# Patient Record
Sex: FEMALE | Race: BLACK OR AFRICAN AMERICAN | NOT HISPANIC OR LATINO | Employment: FULL TIME | URBAN - METROPOLITAN AREA
[De-identification: names, ages, dates, MRNs, and addresses within clinical notes are randomized per-mention and may not be internally consistent; named-entity substitution may affect disease eponyms.]

---

## 2021-04-05 ENCOUNTER — HOSPITAL ENCOUNTER (EMERGENCY)
Facility: HOSPITAL | Age: 56
Discharge: HOME | End: 2021-04-05
Attending: EMERGENCY MEDICINE
Payer: COMMERCIAL

## 2021-04-05 VITALS
TEMPERATURE: 98 F | BODY MASS INDEX: 25.27 KG/M2 | HEART RATE: 62 BPM | DIASTOLIC BLOOD PRESSURE: 58 MMHG | SYSTOLIC BLOOD PRESSURE: 113 MMHG | WEIGHT: 148 LBS | HEIGHT: 64 IN | OXYGEN SATURATION: 99 % | RESPIRATION RATE: 16 BRPM

## 2021-04-05 DIAGNOSIS — M79.601 PAIN OF RIGHT UPPER EXTREMITY: Primary | ICD-10-CM

## 2021-04-05 DIAGNOSIS — E87.6 HYPOKALEMIA: ICD-10-CM

## 2021-04-05 PROBLEM — E78.5 HYPERLIPIDEMIA: Status: ACTIVE | Noted: 2018-02-07

## 2021-04-05 PROBLEM — R26.89 IMPAIRED BALANCE AS LATE EFFECT OF CEREBROVASCULAR ACCIDENT: Status: ACTIVE | Noted: 2018-08-07

## 2021-04-05 PROBLEM — I10 HYPERTENSION: Status: ACTIVE | Noted: 2018-02-07

## 2021-04-05 PROBLEM — I69.398 IMPAIRED BALANCE AS LATE EFFECT OF CEREBROVASCULAR ACCIDENT: Status: ACTIVE | Noted: 2018-08-07

## 2021-04-05 PROBLEM — I69.351 HEMIPARESIS AFFECTING RIGHT SIDE AS LATE EFFECT OF STROKE: Status: ACTIVE | Noted: 2018-02-19

## 2021-04-05 PROBLEM — M25.511 RIGHT SHOULDER PAIN: Status: ACTIVE | Noted: 2021-04-05

## 2021-04-05 PROBLEM — R73.03 PREDIABETES: Status: ACTIVE | Noted: 2019-12-30

## 2021-04-05 PROBLEM — F33.42 RECURRENT MAJOR DEPRESSIVE DISORDER, IN FULL REMISSION: Status: ACTIVE | Noted: 2018-02-19

## 2021-04-05 PROBLEM — I63.81 BASAL GANGLIA INFARCTION: Status: ACTIVE | Noted: 2018-02-07

## 2021-04-05 PROBLEM — M50.90 CERVICAL NECK PAIN WITH EVIDENCE OF DISC DISEASE: Status: ACTIVE | Noted: 2018-06-21

## 2021-04-05 LAB
ALBUMIN SERPL BCP-MCNC: 3.7 G/DL (ref 3.5–5.2)
ALP SERPL-CCNC: 103 U/L (ref 55–135)
ALT SERPL W/O P-5'-P-CCNC: 21 U/L (ref 10–44)
ANION GAP SERPL CALC-SCNC: 9 MMOL/L (ref 8–16)
AST SERPL-CCNC: 23 U/L (ref 10–40)
BASOPHILS # BLD AUTO: 0.03 K/UL (ref 0–0.2)
BASOPHILS NFR BLD: 0.4 % (ref 0–1.9)
BILIRUB SERPL-MCNC: 0.4 MG/DL (ref 0.1–1)
BUN SERPL-MCNC: 15 MG/DL (ref 6–20)
BUN SERPL-MCNC: 16 MG/DL (ref 6–30)
CALCIUM SERPL-MCNC: 9.3 MG/DL (ref 8.7–10.5)
CHLORIDE SERPL-SCNC: 103 MMOL/L (ref 95–110)
CHLORIDE SERPL-SCNC: 104 MMOL/L (ref 95–110)
CHOLEST SERPL-MCNC: 101 MG/DL (ref 120–199)
CHOLEST/HDLC SERPL: 2.4 {RATIO} (ref 2–5)
CO2 SERPL-SCNC: 26 MMOL/L (ref 23–29)
CREAT SERPL-MCNC: 1.2 MG/DL (ref 0.5–1.4)
CREAT SERPL-MCNC: 1.3 MG/DL (ref 0.5–1.4)
CREAT SERPL-MCNC: 1.4 MG/DL (ref 0.5–1.4)
CTP QC/QA: YES
DIFFERENTIAL METHOD: ABNORMAL
EOSINOPHIL # BLD AUTO: 0.2 K/UL (ref 0–0.5)
EOSINOPHIL NFR BLD: 3.1 % (ref 0–8)
ERYTHROCYTE [DISTWIDTH] IN BLOOD BY AUTOMATED COUNT: 13.4 % (ref 11.5–14.5)
EST. GFR  (AFRICAN AMERICAN): 54.1 ML/MIN/1.73 M^2
EST. GFR  (NON AFRICAN AMERICAN): 47 ML/MIN/1.73 M^2
GLUCOSE SERPL-MCNC: 96 MG/DL (ref 70–110)
GLUCOSE SERPL-MCNC: 97 MG/DL (ref 70–110)
GLUCOSE SERPL-MCNC: NORMAL MG/DL (ref 70–110)
HCT VFR BLD AUTO: 32.7 % (ref 37–48.5)
HCT VFR BLD CALC: 33 %PCV (ref 36–54)
HCV AB SERPL QL IA: NEGATIVE
HDLC SERPL-MCNC: 42 MG/DL (ref 40–75)
HDLC SERPL: 41.6 % (ref 20–50)
HGB BLD-MCNC: 10 G/DL (ref 12–16)
HIV 1+2 AB+HIV1 P24 AG SERPL QL IA: NEGATIVE
IMM GRANULOCYTES # BLD AUTO: 0.01 K/UL (ref 0–0.04)
IMM GRANULOCYTES NFR BLD AUTO: 0.1 % (ref 0–0.5)
INR PPP: 1 (ref 0.8–1.2)
LDLC SERPL CALC-MCNC: 44 MG/DL (ref 63–159)
LYMPHOCYTES # BLD AUTO: 2.4 K/UL (ref 1–4.8)
LYMPHOCYTES NFR BLD: 35.8 % (ref 18–48)
MAGNESIUM SERPL-MCNC: 2 MG/DL (ref 1.6–2.6)
MCH RBC QN AUTO: 27.6 PG (ref 27–31)
MCHC RBC AUTO-ENTMCNC: 30.6 G/DL (ref 32–36)
MCV RBC AUTO: 90 FL (ref 82–98)
MONOCYTES # BLD AUTO: 0.4 K/UL (ref 0.3–1)
MONOCYTES NFR BLD: 6.1 % (ref 4–15)
NEUTROPHILS # BLD AUTO: 3.7 K/UL (ref 1.8–7.7)
NEUTROPHILS NFR BLD: 54.5 % (ref 38–73)
NONHDLC SERPL-MCNC: 59 MG/DL
NRBC BLD-RTO: 0 /100 WBC
PLATELET # BLD AUTO: 154 K/UL (ref 150–450)
PMV BLD AUTO: 10.7 FL (ref 9.2–12.9)
POC IONIZED CALCIUM: 1.15 MMOL/L (ref 1.06–1.42)
POC PTINR: 1.1 (ref 0.9–1.2)
POC PTWBT: 12.8 SEC (ref 9.7–14.3)
POC TCO2 (MEASURED): 27 MMOL/L (ref 23–29)
POTASSIUM BLD-SCNC: 2.7 MMOL/L (ref 3.5–5.1)
POTASSIUM SERPL-SCNC: 2.7 MMOL/L (ref 3.5–5.1)
PROT SERPL-MCNC: 7 G/DL (ref 6–8.4)
PROTHROMBIN TIME: 10.6 SEC (ref 9–12.5)
RBC # BLD AUTO: 3.62 M/UL (ref 4–5.4)
SAMPLE: ABNORMAL
SAMPLE: NORMAL
SAMPLE: NORMAL
SARS-COV-2 RDRP RESP QL NAA+PROBE: NEGATIVE
SODIUM BLD-SCNC: 140 MMOL/L (ref 136–145)
SODIUM SERPL-SCNC: 139 MMOL/L (ref 136–145)
TRIGL SERPL-MCNC: 75 MG/DL (ref 30–150)
TSH SERPL DL<=0.005 MIU/L-ACNC: 2.84 UIU/ML (ref 0.4–4)
WBC # BLD AUTO: 6.76 K/UL (ref 3.9–12.7)

## 2021-04-05 PROCEDURE — 99285 EMERGENCY DEPT VISIT HI MDM: CPT | Mod: ,,, | Performed by: PSYCHIATRY & NEUROLOGY

## 2021-04-05 PROCEDURE — 85610 PROTHROMBIN TIME: CPT | Performed by: EMERGENCY MEDICINE

## 2021-04-05 PROCEDURE — 82565 ASSAY OF CREATININE: CPT

## 2021-04-05 PROCEDURE — 93010 ELECTROCARDIOGRAM REPORT: CPT | Mod: ,,, | Performed by: INTERNAL MEDICINE

## 2021-04-05 PROCEDURE — 25000003 PHARM REV CODE 250: Performed by: PHYSICIAN ASSISTANT

## 2021-04-05 PROCEDURE — 80061 LIPID PANEL: CPT | Performed by: EMERGENCY MEDICINE

## 2021-04-05 PROCEDURE — 99285 PR EMERGENCY DEPT VISIT,LEVEL V: ICD-10-PCS | Mod: ,,, | Performed by: PSYCHIATRY & NEUROLOGY

## 2021-04-05 PROCEDURE — 99285 EMERGENCY DEPT VISIT HI MDM: CPT | Mod: 25

## 2021-04-05 PROCEDURE — 93005 ELECTROCARDIOGRAM TRACING: CPT

## 2021-04-05 PROCEDURE — U0002 COVID-19 LAB TEST NON-CDC: HCPCS | Performed by: EMERGENCY MEDICINE

## 2021-04-05 PROCEDURE — 80053 COMPREHEN METABOLIC PANEL: CPT | Performed by: EMERGENCY MEDICINE

## 2021-04-05 PROCEDURE — 85610 PROTHROMBIN TIME: CPT

## 2021-04-05 PROCEDURE — 99285 PR EMERGENCY DEPT VISIT,LEVEL V: ICD-10-PCS | Mod: CS,,, | Performed by: PHYSICIAN ASSISTANT

## 2021-04-05 PROCEDURE — 99285 EMERGENCY DEPT VISIT HI MDM: CPT | Mod: CS,,, | Performed by: PHYSICIAN ASSISTANT

## 2021-04-05 PROCEDURE — 86703 HIV-1/HIV-2 1 RESULT ANTBDY: CPT | Performed by: EMERGENCY MEDICINE

## 2021-04-05 PROCEDURE — 86803 HEPATITIS C AB TEST: CPT | Performed by: EMERGENCY MEDICINE

## 2021-04-05 PROCEDURE — 82962 GLUCOSE BLOOD TEST: CPT

## 2021-04-05 PROCEDURE — 25500020 PHARM REV CODE 255: Performed by: EMERGENCY MEDICINE

## 2021-04-05 PROCEDURE — 93010 EKG 12-LEAD: ICD-10-PCS | Mod: ,,, | Performed by: INTERNAL MEDICINE

## 2021-04-05 PROCEDURE — 85025 COMPLETE CBC W/AUTO DIFF WBC: CPT | Performed by: EMERGENCY MEDICINE

## 2021-04-05 PROCEDURE — 99900035 HC TECH TIME PER 15 MIN (STAT)

## 2021-04-05 PROCEDURE — 84443 ASSAY THYROID STIM HORMONE: CPT | Performed by: EMERGENCY MEDICINE

## 2021-04-05 PROCEDURE — 83735 ASSAY OF MAGNESIUM: CPT | Performed by: PHYSICIAN ASSISTANT

## 2021-04-05 RX ORDER — ASPIRIN 325 MG
325 TABLET ORAL
COMMUNITY

## 2021-04-05 RX ORDER — NITROGLYCERIN 0.4 MG/1
0.4 TABLET SUBLINGUAL DAILY PRN
COMMUNITY

## 2021-04-05 RX ORDER — ARIPIPRAZOLE 5 MG/1
5 TABLET ORAL
COMMUNITY

## 2021-04-05 RX ORDER — TRAZODONE HYDROCHLORIDE 100 MG/1
100 TABLET ORAL
COMMUNITY
End: 2024-01-31

## 2021-04-05 RX ORDER — ACETAMINOPHEN 500 MG
1000 TABLET ORAL
Status: COMPLETED | OUTPATIENT
Start: 2021-04-05 | End: 2021-04-05

## 2021-04-05 RX ORDER — IBUPROFEN 100 MG/5ML
1000 SUSPENSION, ORAL (FINAL DOSE FORM) ORAL
COMMUNITY

## 2021-04-05 RX ORDER — FUROSEMIDE 40 MG/1
TABLET ORAL
COMMUNITY

## 2021-04-05 RX ORDER — ROSUVASTATIN CALCIUM 40 MG/1
TABLET, COATED ORAL
COMMUNITY
Start: 2020-07-14

## 2021-04-05 RX ORDER — FENTANYL 50 UG/1
1 PATCH TRANSDERMAL
COMMUNITY

## 2021-04-05 RX ORDER — ACETAMINOPHEN 500 MG
TABLET ORAL
COMMUNITY

## 2021-04-05 RX ORDER — TOPIRAMATE 100 MG/1
100 TABLET, FILM COATED ORAL
COMMUNITY

## 2021-04-05 RX ORDER — METOPROLOL SUCCINATE 25 MG/1
50 TABLET, EXTENDED RELEASE ORAL
COMMUNITY
End: 2024-01-31

## 2021-04-05 RX ORDER — POTASSIUM CHLORIDE 7.45 MG/ML
10 INJECTION INTRAVENOUS
Status: DISCONTINUED | OUTPATIENT
Start: 2021-04-05 | End: 2021-04-05 | Stop reason: HOSPADM

## 2021-04-05 RX ADMIN — ACETAMINOPHEN 1000 MG: 500 TABLET ORAL at 01:04

## 2021-04-05 RX ADMIN — IOHEXOL 100 ML: 350 INJECTION, SOLUTION INTRAVENOUS at 11:04

## 2022-04-07 ENCOUNTER — HISTORICAL (OUTPATIENT)
Dept: ADMINISTRATIVE | Facility: HOSPITAL | Age: 57
End: 2022-04-07
Payer: COMMERCIAL

## 2022-04-24 VITALS
HEIGHT: 64 IN | BODY MASS INDEX: 25.78 KG/M2 | DIASTOLIC BLOOD PRESSURE: 80 MMHG | WEIGHT: 151 LBS | SYSTOLIC BLOOD PRESSURE: 129 MMHG

## 2023-01-30 ENCOUNTER — APPOINTMENT (OUTPATIENT)
Dept: LAB | Facility: HOSPITAL | Age: 58
End: 2023-01-30
Attending: OBSTETRICS & GYNECOLOGY
Payer: COMMERCIAL

## 2023-01-30 DIAGNOSIS — R68.82 DECREASED LIBIDO: Primary | ICD-10-CM

## 2023-01-30 LAB — TESTOST SERPL-MCNC: <12.98 NG/DL (ref 12.4–35.75)

## 2023-01-30 PROCEDURE — 36415 COLL VENOUS BLD VENIPUNCTURE: CPT

## 2023-01-30 PROCEDURE — 84403 ASSAY OF TOTAL TESTOSTERONE: CPT

## 2023-12-04 DIAGNOSIS — M54.16 LUMBAR RADICULOPATHY: Primary | ICD-10-CM

## 2024-01-31 ENCOUNTER — OFFICE VISIT (OUTPATIENT)
Dept: NEUROLOGY | Facility: CLINIC | Age: 59
End: 2024-01-31
Payer: COMMERCIAL

## 2024-01-31 VITALS
BODY MASS INDEX: 23.39 KG/M2 | WEIGHT: 137 LBS | HEIGHT: 64 IN | SYSTOLIC BLOOD PRESSURE: 124 MMHG | DIASTOLIC BLOOD PRESSURE: 82 MMHG

## 2024-01-31 DIAGNOSIS — G62.9 POLYNEUROPATHY: Primary | ICD-10-CM

## 2024-01-31 DIAGNOSIS — R26.0 SENSORY ATAXIC GAIT: ICD-10-CM

## 2024-01-31 DIAGNOSIS — M54.16 LUMBAR RADICULOPATHY: ICD-10-CM

## 2024-01-31 PROCEDURE — 1159F MED LIST DOCD IN RCRD: CPT | Mod: CPTII,S$GLB,, | Performed by: SPECIALIST

## 2024-01-31 PROCEDURE — 99214 OFFICE O/P EST MOD 30 MIN: CPT | Mod: S$GLB,,, | Performed by: SPECIALIST

## 2024-01-31 PROCEDURE — 99999 PR PBB SHADOW E&M-EST. PATIENT-LVL V: CPT | Mod: PBBFAC,,, | Performed by: SPECIALIST

## 2024-01-31 PROCEDURE — 3008F BODY MASS INDEX DOCD: CPT | Mod: CPTII,S$GLB,, | Performed by: SPECIALIST

## 2024-01-31 PROCEDURE — 3079F DIAST BP 80-89 MM HG: CPT | Mod: CPTII,S$GLB,, | Performed by: SPECIALIST

## 2024-01-31 PROCEDURE — 3074F SYST BP LT 130 MM HG: CPT | Mod: CPTII,S$GLB,, | Performed by: SPECIALIST

## 2024-01-31 RX ORDER — DAPAGLIFLOZIN 10 MG/1
10 TABLET, FILM COATED ORAL DAILY
COMMUNITY
Start: 2024-01-26

## 2024-01-31 RX ORDER — METOPROLOL SUCCINATE 50 MG/1
50 TABLET, EXTENDED RELEASE ORAL
COMMUNITY
Start: 2023-11-10

## 2024-01-31 RX ORDER — ZINC GLUCONATE 50 MG
50 TABLET ORAL DAILY
COMMUNITY

## 2024-01-31 RX ORDER — TRAZODONE HYDROCHLORIDE 150 MG/1
300 TABLET ORAL NIGHTLY
COMMUNITY

## 2024-01-31 RX ORDER — GABAPENTIN 600 MG/1
600 TABLET ORAL 2 TIMES DAILY
COMMUNITY
Start: 2024-01-09 | End: 2024-07-07

## 2024-01-31 RX ORDER — OXYCODONE AND ACETAMINOPHEN 7.5; 325 MG/1; MG/1
1 TABLET ORAL 2 TIMES DAILY PRN
COMMUNITY
Start: 2023-12-30

## 2024-01-31 RX ORDER — LEVOMILNACIPRAN HYDROCHLORIDE 80 MG/1
1 CAPSULE, EXTENDED RELEASE ORAL
COMMUNITY
Start: 2024-01-26

## 2024-01-31 NOTE — PROGRESS NOTES
Subjective:      @Patient ID: Smitha Martinez is a 58 y.o. female.    Chief Complaint: NP ref by Dr Venegas for neuro cons to eval for L Radiculo (  HPI:            Pt has numbness, tingling burning and achy pain in B feet, toe and B legs for over a year. Diff w walking. Pt had MRI at [facility] in 01/2024. Had a EMG done at Dr Sanaz boo 3 mons ago and was told she has Peripheral Neuropathy.     Lumbar surgery 2015 then stroke then pacemaker     Notes may also be on facesheet for HPI, ROS, and other sections   Review of Systems       Social History     Tobacco Use    Smoking status: Never    Smokeless tobacco: Never   Substance Use Topics    Alcohol use: Never    Drug use: Never     [x]       [x] Working     [x] Drives       ----------------------------  Hypertension  Leaky heart valve  Pacemaker  Stroke  Current Outpatient Medications   Medication Instructions    ARIPiprazole (ABILIFY) 5 mg, Oral    ascorbic acid (vitamin C) (VITAMIN C) 1,000 mg, Oral    aspirin 325 mg, Oral    cholecalciferol, vitamin D3, (VITAMIN D3) 50 mcg (2,000 unit) Cap Oral    dapagliflozin propanediol (FARXIGA) 10 mg, Oral, Daily    fentaNYL (DURAGESIC) 50 mcg/hr 1 patch, Transdermal    FETZIMA 80 mg Cs24 1 capsule, Oral    furosemide (LASIX) 40 MG tablet Oral    gabapentin (NEURONTIN) 600 mg, Oral, 2 times daily    metoprolol succinate (TOPROL-XL) 50 mg, Oral    naloxegoL (MOVANTIK) 25 mg, Oral, Daily PRN    nitroGLYCERIN (NITROSTAT) 0.4 mg, Sublingual, Daily PRN    oxyCODONE-acetaminophen (PERCOCET) 7.5-325 mg per tablet 1 tablet, Oral, 2 times daily PRN    rosuvastatin (CRESTOR) 40 MG Tab TAKE ONE TABLET BY MOUTH EVERY DAY AT NIGHT    topiramate (TOPAMAX) 100 mg, Oral    traZODone (DESYREL) 300 mg, Oral, Nightly    zinc gluconate 50 mg, Oral, Daily      Medications Discontinued During This Encounter   Medication Reason    traZODone (DESYREL) 100 MG tablet     metoprolol succinate (TOPROL-XL) 25 MG 24 hr tablet    Topiramate for nerve  "pain     Objective:      Exam:   Visit Vitals  /82   Ht 5' 4" (1.626 m)   Wt 62.1 kg (137 lb)   BMI 23.52 kg/m²     General Exam  Pts  is here         body habitus_ Body mass index is 23.52 kg/m².  [x]Gen exam overall unremarkable    []Abnormalities, if present, checked   [x]Mental Status_alert and appropriate    []Oropharynx_Mallampati grade_1 or 2  [] OP   M3    [] M4  []Neck_ no bruits     [] Bruit   [x]Heart__mostly regular   [x]Extremities_ no edema or lesions   [] Extr edema     Neurological:  []Normal neuro exam        [x]Cortical function seems normal  Abnormalities, if present, checked     [x]Speech __ normal    []    Cranial nerves:    []  [x]CN 2 VF_ok     []  []Fundi_ normal     []  []CN 3, 4, 6 EOMs_ok   [x] Minimal dysconjugate gaze perhaps   []CN 3, pupils_ok   []  [x]CN 7_no lower face asymmetry  []  [x]CN 8_hearing _ ok   []  [x]CN 12 tongue_ok   []    []Motor__ normal all groups   [x] Arose from chair w arms folded but not easily and has L ankle df weakness   []Tone: normal     [x]Floppy L ankle tone   []Reflexes__ normal or unremarkable  [x] Absent   []Vib Sens_ normal in extr's incl toes  [x]Absent at toes   []Pin Sens_    [x]Absent in feet   [x]Plantars__ flat     []  [x]Tremor: _ none    []  []Coordination: _ F to N normal  []  []Gait_       [x]__unassisted but wide based and slow _____  []Romberg: negative    [x]Romberg positive     []MMSE; if done:         No data to display                 Neuroimaging:  [x] Images and imaging reports reviewed.  Rads summary:  My comments: in my view her L spine does not explain her     Labs:    [x]  New Patient         []  Multiple Issues/ diagnoses or problems  [if not enumerated in note then discussed but not documented]    Complexity of Data:   [x] High    [] Moderate   [x] Images and reports reviewed  [] Other studies reviewed   [] History obtained from accompaniment [] Differential Diagnoses discussed   [x] Studies considered/ discussed " but not ordered [] Studies ordered     Risks:   [x] High     [] Moderate   [] (poss or definite) neurodegenerative condition [x] () autoimmune condition with possibility of flares or unexpected attack  [] () seiz d.o. with possib of recurr seiz's  [] Cerebrovasc ds with risk of recurrent stroke  [] CNS meds (and/or) potentially high risk non CNS meds taken or discussed which may cause med or behav SE's  [x] Fall risk [x] Driving discussed  [] Diagnosis unclear or DDx wide making risk uncertain   []:    MDM:    [x] High     [] Moderate       Assessment/Plan:         ICD-10-CM ICD-9-CM   1. Polyneuropathy  G62.9 356.9   2. Sensory ataxic gait  R26.0 781.2   3. Lumbar radiculopathy  M54.16 724.4   Given her age and that her diabetes is not severe and that she's had a small vessel stroke nearly a decade ago I am concerned she may have a systemic autoimmune process with the neuropathy possibly being autoimm or even vasculitic          Other Comments / Follow Up:            Orders Placed This Encounter   Procedures    Ambulatory referral/consult to Neurology    Hopefully neuromuscular colleague Yohan can see her   Consider LP cns repeat imaging     consider nerve and mm biopsy           Duane Bansal MD CLAY FAAN, [facility]  Neuroscience Center Medical Director   [facility]

## 2024-04-05 ENCOUNTER — TELEPHONE (OUTPATIENT)
Dept: NEUROLOGY | Facility: CLINIC | Age: 59
End: 2024-04-05
Payer: COMMERCIAL

## 2024-04-23 ENCOUNTER — TELEPHONE (OUTPATIENT)
Dept: NEUROLOGY | Facility: CLINIC | Age: 59
End: 2024-04-23

## 2024-05-14 ENCOUNTER — TELEPHONE (OUTPATIENT)
Dept: NEUROLOGY | Facility: CLINIC | Age: 59
End: 2024-05-14
Payer: COMMERCIAL

## 2024-05-14 NOTE — TELEPHONE ENCOUNTER
----- Message from Annemarie Alcantar sent at 5/14/2024  1:36 PM CDT -----  Regarding: apt  Contact: [phone]  Pt calling  in to reschedule apt pt was schedule on 5/3/24 please call to discuss Further

## 2024-06-26 ENCOUNTER — TELEPHONE (OUTPATIENT)
Dept: NEUROLOGY | Facility: CLINIC | Age: 59
End: 2024-06-26
Payer: COMMERCIAL

## 2024-06-26 NOTE — TELEPHONE ENCOUNTER
----- Message from Annemarie Alcantar sent at 6/26/2024 10:06 AM CDT -----  Regarding: apt  Contact: [phone]  Pt calling in regarding apt on 7/5/24 pt stated Odalis told her pat was there last time she called to confirm apt I told pt I didn't se apt please call to discuss further

## 2024-07-22 NOTE — PROGRESS NOTES
[facility]Y [address] [address] [facility] [place]    Date: 7/26/24  Patient Name: Smitha Martinez   MRN: [id number]   Referring Provider: No ref. provider found    Thank you so much No ref. provider found for your patient referral to Neuromuscular team at [facility]. We take pride in our care coordination and look forward to your feedback and questions.    Assessment:   Ms. Martinez is a 58 year-old female who presents for neuropathy. Neurologic examination demonstrates a length-dependent predominantly sensory (small and large fiber) asymmetric (L > R) polyneuropathy. Large fiber dysfunction is prominent, with positive Romberg and marked gait difficulty. Suspect that underlying etiology is metabolic: at this time, diabetes and renal dysfunction are the most apparent contributors. However, merits broader work-up with additional consideration of autoimmune/infectious causes. EMG should be repeated.    Plan:   See below.    Problem List Items Addressed This Visit          Neuro    Polyneuropathy - Primary    Current Assessment & Plan     -neuropathy laboratories  ->B1, B6, B12/MMA  ->ESR/CRP  ->AFSHAN, SSA/B, RF/CCP  ->ganglioside panel  ->hepatitis, HIV, Treponema pallidum serologies  ->heavy metal screen  -EMG 3 extremities following above evaluation         Relevant Orders    VITAMIN B1    VITAMIN B6    VITAMIN B12 (Completed)    METHYLMALONIC ACID, SERUM    Sedimentation rate (Completed)    C-REACTIVE PROTEIN (Completed)    AFSHAN    RHEUMATOID FACTOR (Completed)    ANTI -SSA ANTIBODY    ANTI-SSB ANTIBODY    Treponema Pallidium Antibodies IgG, IgM (Completed)    HEAVY METALS SCREEN, BLOOD (QUANTITATIVE)    HEPATITIS PANEL, ACUTE (Completed)    IMMUNOGLOBULIN FREE LT CHAINS BLOOD    Ganglioside Antibody Panel, Serum    EMG W/ ULTRASOUND AND NERVE CONDUCTION TEST 3 Extremities    HIV 1/2 Ag/Ab (4th Gen)     RUTHY Hector D.O.  Neurology PGY III  [facility]     This evaluation was completed  "in >60  Minutes over 50% of the time spent on education & counseling. This includes face to face time and non-face to face time preparing to see the patient (eg, review of tests), obtaining and/or reviewing separately obtained history, documenting clinical information in the electronic or other health record, independently interpreting results and communicating results to the patient/family/caregiver, or care coordinator.    Visit today is associated with current or anticipated ongoing medical care related to this patient's single serious condition/complex condition (polyneuropathy). Follow up: 3 months.    Details provided by:    Patient  Family-spouse    Reason for visit: paresthesias and weakness    HISTORY OF PRESENT ILLNESS   HPI: 7/26/24  58F. Hx. diabetes mellitus type II, stroke (2015), bradycardia s/p pacemaker placement, remote left ovarian s/p resection (retained uterus and right ovary). Presents for evaluation of neuropathy. Has previously seen Dr. Bansal (neurology), with there being concern for an underlying vasculitic/autoimmune cause. Patient states that symptoms began ~1.5 years ago, with paresthesias (numbness/tingling, "zapping"/electric-like sensation, occasional "coldness") over the left hallux. Quickly evolved to involved all of the digits of the LLE, then progressed to the knee and then the thigh--in a diffuse/circumferential manner--within 4 months. She began to experience similar symptoms in the RLE ~6 months ago, which then progressed up to the thigh within 4-5 months. Hasn't noticed change in paresthesias with temperature. Denies rash, though notes previous oral ulcers. Along with paresthesias, has experienced worsening weakness in the bilateral lower extremities: difficulty going up stairs, "dragging feet" (L>R), more recently RUE weakness--all of this has impaired her ability to perform ADLs,  helps her stand, walk. She has needed to start using a cane. Has sustained several " falls. Denies tobacco or alcohol use. 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 Lives with .    Review of Systems:  12 system review of systems is negative except for the symptoms mentioned in HPI.     Chart Review:  Neuroimaging reviewed/interpreted  MRI brain w/o contrast: 5/8/15  Findings: There is artifact from dental device. The area of acute restricted diffusion seen on the previous study in the left parietal lobe has resolved. On viewed areas, there is no evidence of new acute CVA however, the frontal lobes are obscured. On axial T2 and FLAIR sequences, there is minimal small vessel ischemic change involving the left parietal lobe likely representing evolution in region of prior infarct. All findings appear chronic. There is no evidence of hemorrhage. No new areas of acute restricted diffusion are identified. The suprasellar cistern basal cisterns and cerebellopontine angle regions are unremarkable. The pituitary gland pituitary infundibulum optic chiasm dianne brainstem and cerebellum are normal as well. Intracranial flow voids are unremarkable. There is no evidence of new acute disease.     Impression: Interval evolution of left-sided focal acute infarct since prior study. All findings are now chronic with no evidence of new acute disease.     MRI cervical spine w/o contrast: 7/3/18  Findings:  There is straightening of the cervical lordosis. Cervical vertebral bodies are normal in height. Disc space narrowing C5-C6 and C6-C7. Disc desiccation throughout the cervical spine. Anterior osteophytes C5, C6 and C7. Cervical vertebral bodies well aligned. No fracture identified. No prevertebral soft tissue space swelling. The cervical spinal cord demonstrates normal signal intensity.     MRI lumbar spine w/o contrast: 1/10/23  T12-L1: Only captured on sagittal. Severe left foraminal narrowing related to symmetric disc bulge and facet hypertrophy. No significant right foraminal narrowing.   L1-L2: Moderate right and  "mild left foraminal narrowing related to symmetric disc bulge and facet hypertrophy. No significant canal narrowing.   L2-L3: Mild canal, moderate right and mild left foraminal narrowing related to symmetric disc bulge and facet hypertrophy.   L3-L4: Postsurgical changes. Mild right foraminal narrowing related to facet hypertrophy. No significant canal or left foraminal narrowing.   L4-L5: Postsurgical changes. Mild right foraminal narrowing related to facet hypertrophy. No significant canal or left foraminal narrowing.   L5-S1: Mild right foraminal narrowing related to symmetric disc bulge and facet hypertrophy. No significant canal or left foraminal narrowing.     CTA head/neck: 4/5/21  No evidence of acute hemorrhage or major vascular distribution infarct. CT arteriogram demonstrates no evidence of advanced atherosclerotic change. No high-grade stenosis or large vessel occlusion. Aberrant right subclavian artery. Postsurgical and degenerative change in the cervical spine.    Neurophysiology reviewed  EMG   -done previously per Dr. Yo: not available in EMR, but reportedly demonstrated "peripheral neuropathy"    Laboratories reviewed  CMP  -Cr 1.22, eGFR 52  Lipid Panel (4/1/24)  -cholesterol 156  -TAGs: 40  A1C  -11/20/23: 6.0%  -12/9/21: 6.9%  TSH (4/1/24)  -0.781  B9: 11/22/23  -3.9  B12: 11/22/23  -454  SPEP/VERONA: 11/22/23  -unremarkable  Hepatitis C: 4/5/21  -negative    Consultant's notes reviewed  -[facility]: 1/13/24    PHYSICAL EXAMINATION     Vitals:    07/26/24 0752   BP: 124/74   Pulse: 77   Weight: 71.1 kg (156 lb 10.2 oz)       Body mass index is 26.89 kg/m².     GENERAL/CONSTITUTIONAL/SYSTEMIC:    -Well appearing; well nourished    Head: Atraumatic, normocephalic  HEENT: PERRLA, EOMI, Oral mucosa moist  Neck: Supple, trachea midline  Cardiovascular: Regular rate and rhythm  Pulmonary: CTAB, no increased work of breathing, no rhonchi or wheezing  Abdominal: Soft, non-tender, " non-distended  Extremities: Warm, well-perfused, no significant edema  Psychiatric: Normal mood & affect; behavior normal & appropriate  Skin: No jaundice, rashes    HIGHER INTEGRATIVE FUNCTIONS:  -Attention & concentration: Normal  -Orientation: Oriented to person, place & time  -Memory: Normal  -Language: Normal  -Fund of Knowledge: Normal    CRANIAL NERVES:  -CN 2: Visual fields full  -CN 2,3: PERRL  -CN 3,4,6: EOMI  -CN 5: Facial sensation intact bilaterally  -CN 7: Mild right facial droop (chronic)  -CN 8: Hearing normal bilaterally  -CN 9,10: Palate elevates symmetrically  -CN 11: Normal shoulder shrug and head turn  -CN 12: Tongue protrudes midline    MOTOR:  -Tone: normal in upper and lower extremities  -UE/LE motor: 5/5 throughout, aside from hip flexion (4+/5 bilaterally)    SENSATION:  -Diminished vibration to knees bilaterally, diminished pin-prick in a length-dependent/circumferential pattern to the left knee and right mid-shin (~2cm above the ankle); patchy diminished sensation throughout the upper extremities, more consistently present over the dorsum of the hands/forearms  -Romberg overtly positive    REFLEXES:  -2/4 upper extremities bilaterally, 1/4 lower extremities bilaterally  -Flexor plantar reflex bilaterally    COORDINATION:  -FNF normal bilaterally    GAIT:  -marked difficulty with tandem gait    Scheduled Follow-up:  Future Appointments   Date Time Provider Department Center   8/5/2024  2:00 PM Duane Bansal MD [address]       After Visit Medication List :     Medication List            Accurate as of July 26, 2024 11:03 PM. If you have any questions, ask your nurse or doctor.                CONTINUE taking these medications      ARIPiprazole 5 MG Tab  Commonly known as: ABILIFY     ascorbic acid (vitamin C) 1000 MG tablet  Commonly known as: VITAMIN C     aspirin 325 MG tablet     cholecalciferol (vitamin D3) 50 mcg (2,000 unit) Cap capsule  Commonly known as: VITAMIN  D3     dapagliflozin propanediol 10 mg tablet  Commonly known as: Farxiga     fentaNYL 50 mcg/hr  Commonly known as: DURAGESIC     FETZIMA 80 mg Cs24  Generic drug: levomilnacipran     gabapentin 600 MG tablet  Commonly known as: NEURONTIN     LASIX 40 MG tablet  Generic drug: furosemide     metoprolol succinate 50 MG 24 hr tablet  Commonly known as: TOPROL-XL     naloxegoL 25 mg tablet  Commonly known as: MOVANTIK     NITROSTAT 0.4 MG SL tablet  Generic drug: nitroGLYCERIN     oxyCODONE-acetaminophen 7.5-325 mg per tablet  Commonly known as: PERCOCET     rosuvastatin 40 MG Tab  Commonly known as: CRESTOR     topiramate 100 MG tablet  Commonly known as: TOPAMAX     traZODone 150 MG tablet  Commonly known as: DESYREL     zinc gluconate 50 mg tablet              Signing Physician:      Miguel Almanzar M.D.  , [employer] [facility] [employer]  Section Head Neuromuscular Medicine. [facility].   [facility]. 7th [place] [address].    This note was generated with the assistance of ambient listening technology. Verbal consent was obtained by the patient and accompanying visitor(s) for the recording of patient appointment to facilitate this note. I attest to having reviewed and edited the generated note for accuracy, though some syntax or spelling errors may persist. Please contact the author of this note for any clarification.

## 2024-07-26 ENCOUNTER — LAB VISIT (OUTPATIENT)
Dept: LAB | Facility: HOSPITAL | Age: 59
End: 2024-07-26
Payer: COMMERCIAL

## 2024-07-26 ENCOUNTER — OFFICE VISIT (OUTPATIENT)
Dept: NEUROLOGY | Facility: CLINIC | Age: 59
End: 2024-07-26
Payer: COMMERCIAL

## 2024-07-26 VITALS
DIASTOLIC BLOOD PRESSURE: 74 MMHG | SYSTOLIC BLOOD PRESSURE: 124 MMHG | BODY MASS INDEX: 26.89 KG/M2 | WEIGHT: 156.63 LBS | HEART RATE: 77 BPM

## 2024-07-26 DIAGNOSIS — G62.9 POLYNEUROPATHY: ICD-10-CM

## 2024-07-26 DIAGNOSIS — G62.9 POLYNEUROPATHY: Primary | ICD-10-CM

## 2024-07-26 LAB
CRP SERPL-MCNC: 12.1 MG/L (ref 0–8.2)
ERYTHROCYTE [SEDIMENTATION RATE] IN BLOOD BY PHOTOMETRIC METHOD: 40 MM/HR (ref 0–36)
HAV IGM SERPL QL IA: NORMAL
HBV CORE IGM SERPL QL IA: NORMAL
HBV SURFACE AG SERPL QL IA: NORMAL
HCV AB SERPL QL IA: NORMAL
RHEUMATOID FACT SERPL-ACNC: <13 IU/ML (ref 0–15)
TREPONEMA PALLIDUM IGG+IGM AB [PRESENCE] IN SERUM OR PLASMA BY IMMUNOASSAY: NONREACTIVE
VIT B12 SERPL-MCNC: 856 PG/ML (ref 210–950)

## 2024-07-26 PROCEDURE — 85652 RBC SED RATE AUTOMATED: CPT

## 2024-07-26 PROCEDURE — 82175 ASSAY OF ARSENIC: CPT

## 2024-07-26 PROCEDURE — 84425 ASSAY OF VITAMIN B-1: CPT

## 2024-07-26 PROCEDURE — 80074 ACUTE HEPATITIS PANEL: CPT

## 2024-07-26 PROCEDURE — 86235 NUCLEAR ANTIGEN ANTIBODY: CPT | Mod: 59

## 2024-07-26 PROCEDURE — 83921 ORGANIC ACID SINGLE QUANT: CPT

## 2024-07-26 PROCEDURE — 84207 ASSAY OF VITAMIN B-6: CPT

## 2024-07-26 PROCEDURE — 83825 ASSAY OF MERCURY: CPT

## 2024-07-26 PROCEDURE — 86431 RHEUMATOID FACTOR QUANT: CPT

## 2024-07-26 PROCEDURE — 86593 SYPHILIS TEST NON-TREP QUANT: CPT

## 2024-07-26 PROCEDURE — 83516 IMMUNOASSAY NONANTIBODY: CPT | Mod: 59

## 2024-07-26 PROCEDURE — 86235 NUCLEAR ANTIGEN ANTIBODY: CPT

## 2024-07-26 PROCEDURE — 86038 ANTINUCLEAR ANTIBODIES: CPT

## 2024-07-26 PROCEDURE — 86140 C-REACTIVE PROTEIN: CPT

## 2024-07-26 PROCEDURE — 83521 IG LIGHT CHAINS FREE EACH: CPT

## 2024-07-26 PROCEDURE — 36415 COLL VENOUS BLD VENIPUNCTURE: CPT

## 2024-07-26 PROCEDURE — 82607 VITAMIN B-12: CPT

## 2024-07-26 PROCEDURE — 99999 PR PBB SHADOW E&M-EST. PATIENT-LVL III: CPT | Mod: PBBFAC,,, | Performed by: PSYCHIATRY & NEUROLOGY

## 2024-07-26 NOTE — ASSESSMENT & PLAN NOTE
-neuropathy laboratories  ->B1, B6, B12/MMA  ->ESR/CRP  ->AFSHAN, SSA/B, RF/CCP  ->ganglioside panel  ->hepatitis, HIV, Treponema pallidum serologies  ->heavy metal screen  -EMG 3 extremities following above evaluation

## 2024-07-27 LAB
ARSENIC BLD-MCNC: <1 NG/ML
CADMIUM BLD-MCNC: <0.2 NG/ML
CITY: NORMAL
COUNTY: NORMAL
GUARDIAN FIRST NAME: NORMAL
GUARDIAN LAST NAME: NORMAL
HOME PHONE: NORMAL
LEAD BLD-MCNC: <1 MCG/DL
MERCURY BLD-MCNC: <1 NG/ML
RACE: NORMAL
STATE: NORMAL
STREET ADDRESS: NORMAL
VENOUS/CAPILLARY: NORMAL
ZIP: NORMAL

## 2024-07-29 LAB
ANA SER QL IF: NORMAL
KAPPA LC SER QL IA: 3.5 MG/DL (ref 0.33–1.94)
KAPPA LC/LAMBDA SER IA: 1.25 (ref 0.26–1.65)
LAMBDA LC SER QL IA: 2.8 MG/DL (ref 0.57–2.63)

## 2024-07-30 LAB
ANTI-SSA ANTIBODY: 0.07 RATIO (ref 0–0.99)
ANTI-SSA INTERPRETATION: NEGATIVE
ANTI-SSB ANTIBODY: 0.05 RATIO (ref 0–0.99)
ANTI-SSB INTERPRETATION: NEGATIVE
DEPRECATED GD1B DISIALYL IGG SER QL: NEGATIVE
DEPRECATED GD1B DISIALYL IGM SER QL: NEGATIVE
GM1 ASIALO IGG SER QL: NEGATIVE
GM1 ASIALO IGM SER QL: NEGATIVE
GM1 GANGL IGG SER QL: NEGATIVE
GM1 GANGL IGM SER QL: NEGATIVE
METHYLMALONATE SERPL-SCNC: 0.19 UMOL/L
PYRIDOXAL SERPL-MCNC: 26 UG/L (ref 5–50)
VIT B1 BLD-MCNC: 92 UG/L (ref 38–122)

## 2024-08-02 ENCOUNTER — PATIENT MESSAGE (OUTPATIENT)
Dept: NEUROLOGY | Facility: CLINIC | Age: 59
End: 2024-08-02
Payer: COMMERCIAL

## 2024-08-05 ENCOUNTER — E-CONSULT (OUTPATIENT)
Dept: HEMATOLOGY/ONCOLOGY | Facility: CLINIC | Age: 59
End: 2024-08-05
Payer: COMMERCIAL

## 2024-08-05 DIAGNOSIS — R89.9 ABNORMAL LABORATORY TEST: Primary | ICD-10-CM

## 2024-08-05 PROCEDURE — 99451 NTRPROF PH1/NTRNET/EHR 5/>: CPT | Mod: S$GLB,,, | Performed by: INTERNAL MEDICINE

## 2024-09-06 ENCOUNTER — TELEPHONE (OUTPATIENT)
Dept: NEUROLOGY | Facility: CLINIC | Age: 59
End: 2024-09-06
Payer: COMMERCIAL

## 2024-09-06 NOTE — TELEPHONE ENCOUNTER
----- Message from Belinda Anish Moreno sent at 8/14/2024  1:51 PM CDT -----  Regarding: Schedule EMG  Contact: Pt @[phone]  Pt is calling to speak to someone in the office to schedule for an EMG. Pt has an active request in Epic. Please call to advise. Thanks.         Referring Provider: Josemanuel Paris

## 2024-09-23 ENCOUNTER — PROCEDURE VISIT (OUTPATIENT)
Dept: NEUROLOGY | Facility: CLINIC | Age: 59
End: 2024-09-23
Payer: COMMERCIAL

## 2024-09-23 DIAGNOSIS — G62.9 POLYNEUROPATHY: ICD-10-CM

## 2024-09-23 PROCEDURE — 95885 MUSC TST DONE W/NERV TST LIM: CPT | Mod: S$GLB,,, | Performed by: PSYCHIATRY & NEUROLOGY

## 2024-09-23 PROCEDURE — 95912 NRV CNDJ TEST 11-12 STUDIES: CPT | Mod: S$GLB,,, | Performed by: PSYCHIATRY & NEUROLOGY

## 2024-09-23 NOTE — PROCEDURES
Department of Neurology  Phone No: [phone], Fax: [phone]    Neurography & Electromyography Report        Full Name: Smitha Martinez Gender: Female  Patient ID: [id number] YOB: 1965      Visit Date: 9/23/2024 11:39 AM  Age: 58 Years  Examining Physician: Miguel Almanzar MD  Referring Physician: MARY Hector MD  Height: 5 feet 4 inch  Weight: 149 lbs        Smitha Martinez [id number] 9/23/2024 11:39 AM     Reason for Referral:    Polyneuropathy for evaluation.      Relevant medical diagnoses:    Prediabetes.      Smitha Martinez [id number] 9/23/2024 11:39 AM       History and Examination:    58-year-old female with impaired mobility.  On examination, diminished ankle reflexes with impaired pinprick and vibratory sensory loss in lower extremities but normal strength.      Technical Difficulties:    None.      Interpretation:     Abnormal study.  There is electrodiagnostic evidence of length-dependent mild sensory axonal polyneuropathy (SRAR=0.20). The differential diagnosis for sensory polyneuropathy may include vitamin deficiency, immune mediated, paraneoplastic, toxic/metabolic neuropathy.  There is no evidence of left lumbosacral radiculopathy.    Miguel Almanzar MD, FRCPE, FCPS, CHCQM  Neuromuscular Consultant  [facility]    Smitha Martinez [id number] 9/23/2024 11:39 AM                  Sensory NCS      Nerve / Sites Rec. Site Segments Onset Lat Peak Lat Onset Higinio Temp. Amp Distance      ms ms m/s °C µV mm   L Median - Dig II (Antidromic)      Wrist Index Wrist - Index 2.71 3.96 51.7 29.7 36.0 140      Ref.  Ref. <=3.30 <=4.00   >=7.0    L Ulnar - Dig V (Antidromic)      Wrist Dig V Wrist - Dig V 3.07 4.06 45.6 30.1 19.7 140      Ref.  Ref. <=3.10 <=4.00   >=5.0    L Radial - Superficial (Antidromic)      Forearm Wrist Forearm - Wrist 1.61 2.29 61.9 30.3 51.3 100      Ref.  Ref. <=2.20 <=2.80   >=7.0    R Sural - (Antidromic)      Calf Ankle Calf - Ankle 3.28 4.11 42.7 32.5 7.0 140       Ref.  Ref. <=3.60 <=4.50   >=4.0    L Sural - Orthodromic, Calf (Ankle)      Ankle Calf Ankle - Calf 2.66 3.54 52.7 31.7 10.4 140       Motor NCS      Nerve / Sites Muscle Segments Latency Ref. Velocity Ref. Amplitude Ref. Temp. Dur. Distance      ms ms m/s m/s mV mV °C ms mm   L Median - APB      Wrist APB Wrist - APB 3.15 <=4.40   13.3 >=4.2 30.4 7.5 80      Elbow APB Elbow - Wrist 7.83  47 >=51 13.3  30.6 7.7 220   L Ulnar - ADM      Wrist ADM Wrist - ADM 2.90 <=3.70   9.4 >=7.9 30.7 7.6 80      B.Elbow ADM B.Elbow - Wrist 7.15  48 >=52 9.4  30.7 7.9 205      A.Elbow ADM A.Elbow - B.Elbow 9.13  56 >=43 9.3  30.7 7.6 110     A.Elbow - Wrist       30.7     L Peroneal - EDB      Ankle EDB Ankle - EDB 2.96 <=6.50   6.5 >=1.1 31.7 5.5 80      B. Fib Head EDB B. Fib Head - Ankle 11.10  40 >=39 5.4  31.6 6.2 325      A. Fib Head EDB A. Fib Head - B. Fib Head 13.23  47 >=42 5.2  31.6 6.1 100   R Peroneal - EDB      Ankle EDB Ankle - EDB 4.50 <=6.50   5.2 >=1.1 32.2 5.6 80      B. Fib Head EDB B. Fib Head - Ankle 12.10  42 >=39 4.1  32.2 6.9 320      A. Fib Head EDB A. Fib Head - B. Fib Head 14.23  47 >=42 3.8  32.1 6.7 100   L Tibial - AH      Ankle AH Ankle - AH 4.54 <=6.10   13.4 >=5.3 32.4 5.7 80   R Tibial - AH      Ankle AH Ankle - AH 4.50 <=6.10   8.4 >=5.3 32 5.4 80       F  Wave      Nerve F Latency Ref. M Latency F - M Lat    ms ms ms ms   L Median - APB 27.9 <=30.3 3.9 24.0   L Ulnar - ADM 29.6 <=30.9 3.2 26.5   L Peroneal - EDB 55.2 <=58.8 4.0 51.2   L Tibial - AH 53.7 <=57.5 5.1 48.6   R Peroneal - EDB 55.3 <=58.8 5.1 50.2   R Tibial - AH 53.9 <=57.5 5.2 48.7       EMG Summary Table     Spontaneous Recruitment MUAP   Muscle Nerve Roots IA Fib PSW Fasc Other Pattern Amp Dur. PPP   L. Tibialis anterior Deep peroneal (Fibular) L4-L5 N None None None N N N N N   L. Gastrocnemius Tibial S1-S2 N None None None N N N N N   L. Quadriceps Femoral L2-L4 N None None None N N N N N   L. L5 paraspinal Spinal L5- N None  None None N N N N N

## 2024-10-01 ENCOUNTER — OFFICE VISIT (OUTPATIENT)
Dept: NEUROLOGY | Facility: CLINIC | Age: 59
End: 2024-10-01
Payer: COMMERCIAL

## 2024-10-01 VITALS
SYSTOLIC BLOOD PRESSURE: 122 MMHG | WEIGHT: 149 LBS | BODY MASS INDEX: 25.44 KG/M2 | DIASTOLIC BLOOD PRESSURE: 78 MMHG | HEIGHT: 64 IN

## 2024-10-01 DIAGNOSIS — G62.9 POLYNEUROPATHY: Primary | ICD-10-CM

## 2024-10-01 DIAGNOSIS — R26.0 SENSORY ATAXIC GAIT: ICD-10-CM

## 2024-10-01 DIAGNOSIS — R29.6 MULTIPLE FALLS: ICD-10-CM

## 2024-10-01 PROCEDURE — 1160F RVW MEDS BY RX/DR IN RCRD: CPT | Mod: CPTII,S$GLB,, | Performed by: SPECIALIST

## 2024-10-01 PROCEDURE — 3074F SYST BP LT 130 MM HG: CPT | Mod: CPTII,S$GLB,, | Performed by: SPECIALIST

## 2024-10-01 PROCEDURE — 99214 OFFICE O/P EST MOD 30 MIN: CPT | Mod: S$GLB,,, | Performed by: SPECIALIST

## 2024-10-01 PROCEDURE — 3008F BODY MASS INDEX DOCD: CPT | Mod: CPTII,S$GLB,, | Performed by: SPECIALIST

## 2024-10-01 PROCEDURE — 1159F MED LIST DOCD IN RCRD: CPT | Mod: CPTII,S$GLB,, | Performed by: SPECIALIST

## 2024-10-01 PROCEDURE — 3078F DIAST BP <80 MM HG: CPT | Mod: CPTII,S$GLB,, | Performed by: SPECIALIST

## 2024-10-01 PROCEDURE — 99999 PR PBB SHADOW E&M-EST. PATIENT-LVL V: CPT | Mod: PBBFAC,,, | Performed by: SPECIALIST

## 2024-10-01 RX ORDER — TIZANIDINE 4 MG/1
4 TABLET ORAL 2 TIMES DAILY PRN
COMMUNITY

## 2024-10-01 RX ORDER — HYDROXYZINE HYDROCHLORIDE 50 MG/1
TABLET, FILM COATED ORAL
COMMUNITY
Start: 2024-03-13

## 2024-10-01 RX ORDER — TRAZODONE HYDROCHLORIDE 150 MG/1
300 TABLET ORAL NIGHTLY
COMMUNITY

## 2024-10-01 NOTE — PROGRESS NOTES
Neurology Note    Subjective:       Patient ID: Smitha Martinez is a 58 y.o. female.    Chief Complaint: f/u polyneuropathy    HPI:            Pt presents for polyneuropathy f/u. Reports numbness, tingling, achy and sharp pain in B legs and feet are worse. Since last visit, she was seen by Dr. Almanzar in [place]. Had thorough lab work-up for neuropathy done as well as EMG and was told she does not have nerve damage.       ROS: as per HPI, otherwise pertinent systems review is negative          Past Medical History:   Diagnosis Date    Hypertension     Leaky heart valve     Pacemaker     Stroke 2016       History reviewed. No pertinent surgical history.    No family history on file.    Social History     Socioeconomic History    Marital status:    Tobacco Use    Smoking status: Never    Smokeless tobacco: Never   Substance and Sexual Activity    Alcohol use: Never    Drug use: Never    Sexual activity: Yes     Partners: Male       Review of patient's allergies indicates:   Allergen Reactions    Peach (prunus persica)     Sulfamethoxazole-trimethoprim        Current Outpatient Medications   Medication Instructions    ARIPiprazole (ABILIFY) 5 mg    ascorbic acid (vitamin C) (VITAMIN C) 1,000 mg    aspirin 325 mg    cholecalciferol, vitamin D3, (VITAMIN D3) 50 mcg (2,000 unit) Cap Take by mouth.    dapagliflozin propanediol (FARXIGA) 10 mg, Daily    fentaNYL (DURAGESIC) 50 mcg/hr 1 patch, Transdermal    FETZIMA 80 mg Cs24 1 capsule    furosemide (LASIX) 40 MG tablet Take by mouth.    gabapentin (NEURONTIN) 600 mg, 2 times daily    hydrOXYzine (ATARAX) 50 MG tablet TAKE ONE TABLET BY MOUTH THREE TIMES DAILY AS NEEDED FOR ANXIETY AS NEEDED    metoprolol succinate (TOPROL-XL) 50 mg    naloxegoL (MOVANTIK) 25 mg, Daily PRN    nitroGLYCERIN (NITROSTAT) 0.4 mg, Daily PRN    oxyCODONE-acetaminophen (PERCOCET) 7.5-325 mg per tablet 1 tablet, 2 times daily PRN    rosuvastatin (CRESTOR) 40 MG Tab TAKE ONE TABLET BY  "MOUTH EVERY DAY AT NIGHT    tiZANidine (ZANAFLEX) 4 mg, 2 times daily PRN    topiramate (TOPAMAX) 100 mg    traZODone (DESYREL) 300 mg, Nightly    traZODone (DESYREL) 300 mg, Nightly    zinc gluconate 50 mg, Daily       Objective:      Exam:   Visit Vitals  /78   Ht 5' 4" (1.626 m)   Wt 67.6 kg (149 lb)   BMI 25.58 kg/m²       Physical Exam  Constitutional: Appearance: No acute distress, well developed & well nourished    Accompanied by: self  HENT: Head: normocephalic/atraumatic  Eyes: Conjunctiva clear, non-icteric  Cardiovascular: Regular rate and rhythm  Pulmonary:  Pulmonary effort is normal  Musculoskeletal: General: Normal range of motion  Skin: Skin is warm and dry, no obvious lesions  Peripheral extremities: no edema  Psychiatric: mood and affect normal. Behavior normal, cooperative     Neuro exam:    Mental status:  The patient is alert and oriented to person, place and time.  Language is intact and fluent  Normal attention and concentration  Cranial Nerves:  Pupils are equal, round, and reactive to light   EOM intact, no nystagmus, no ptosis, no gaze preference or deviation    Visual fields are full to confrontation testing  Facial movement is symmetric, normal cheek puff and smile  Hearing is intact  Coordination:     Finger to nose - normal and symmetric bilaterally  No dysmetria  Motor:  Normal muscle bulk and symmetry  No lateralizing features  Tone:   Normal, no ankle clonus  Gait:  Spastic/ataxic  Sensory:   Decreased vib and pinprick in bilateral feet, otherwise nml  Reflexes: trace KJ bilateral   Plantars: silent  Tremor: none    Review of Data:   Prior notes reviewed.   EMG from 9/23/24 with Dr. Almanzar Reviewed. EMG interpretation: "Abnormal study. There is electrodiagnostic evidence of length-dependent mild sensory axonal polyneuropathy (SRAR=0.20). The differential diagnosis for sensory polyneuropathy may include vitamin deficiency, immune mediated, paraneoplastic, toxic/metabolic " "neuropathy. There is no evidence of left lumbosacral radiculopathy."  Labs:  Extensive polyneuropathy panel of labs from 7/24 reviewed   Imaging:  No results found for this or any previous visit.        Assessment/Plan:   1. Multiple falls  2. Polyneuropathy  3. Sensory ataxic gait    -     MRI Brain W WO Contrast; Future; Expected date: 10/01/2024  -     MRI Cervical Spine W WO Cont; Future; Expected date: 10/01/2024      Obtain brain and c spine MRI now; T & L spine MRI in the near future ( within the next month-6 weeks)    Follow-up: virtual visit 2 months (hopeful all imaging has been complete by then)    Dr. Bansal physically present in exam room during patient encounter.   I, Jodi Nelson NP acted solely as a scribe for and in the presence of Dr. Bansal who performed the service.    Jodi Nelson, MSN, APRN, FNP-C  [facility]  [phone]    "

## 2024-11-06 ENCOUNTER — HOSPITAL ENCOUNTER (OUTPATIENT)
Dept: RADIOLOGY | Facility: HOSPITAL | Age: 59
Discharge: HOME | End: 2024-11-06
Payer: COMMERCIAL

## 2024-11-06 DIAGNOSIS — R29.6 MULTIPLE FALLS: ICD-10-CM

## 2024-11-06 DIAGNOSIS — R26.0 SENSORY ATAXIC GAIT: ICD-10-CM

## 2024-11-06 DIAGNOSIS — G62.9 POLYNEUROPATHY: ICD-10-CM

## 2024-11-18 ENCOUNTER — HOSPITAL ENCOUNTER (OUTPATIENT)
Dept: RADIOLOGY | Facility: HOSPITAL | Age: 59
Discharge: HOME | End: 2024-11-18
Payer: COMMERCIAL

## 2024-11-18 PROCEDURE — A9577 INJ MULTIHANCE: HCPCS

## 2024-11-18 PROCEDURE — 70553 MRI BRAIN STEM W/O & W/DYE: CPT | Mod: TC

## 2024-11-18 PROCEDURE — 25500020 PHARM REV CODE 255

## 2024-11-18 PROCEDURE — 72156 MRI NECK SPINE W/O & W/DYE: CPT | Mod: TC

## 2024-11-18 RX ADMIN — GADOBENATE DIMEGLUMINE 15 ML: 529 INJECTION, SOLUTION INTRAVENOUS at 01:11

## 2024-12-03 ENCOUNTER — OFFICE VISIT (OUTPATIENT)
Dept: NEUROLOGY | Facility: CLINIC | Age: 59
End: 2024-12-03
Payer: COMMERCIAL

## 2024-12-03 DIAGNOSIS — G62.9 POLYNEUROPATHY: Primary | ICD-10-CM

## 2024-12-03 DIAGNOSIS — R26.89 IMPAIRED BALANCE AS LATE EFFECT OF CEREBROVASCULAR ACCIDENT: ICD-10-CM

## 2024-12-03 DIAGNOSIS — I69.398 IMPAIRED BALANCE AS LATE EFFECT OF CEREBROVASCULAR ACCIDENT: ICD-10-CM

## 2024-12-03 PROCEDURE — 99213 OFFICE O/P EST LOW 20 MIN: CPT | Mod: 95,,, | Performed by: SPECIALIST

## 2024-12-03 PROCEDURE — 3060F POS MICROALBUMINURIA REV: CPT | Mod: CPTII,95,, | Performed by: SPECIALIST

## 2024-12-03 PROCEDURE — 3066F NEPHROPATHY DOC TX: CPT | Mod: CPTII,95,, | Performed by: SPECIALIST

## 2024-12-03 NOTE — PROGRESS NOTES
This is a telemedicine note. See bottom of note for boilerplate elements.     Smitha Martinez is a 59 y.o. female seen today via telemedicine visit.   Subjective:    Patient ID: Smitha Martinez is a 59 y.o. female.  Chief Complaint: virtual follow up for dx or symptoms: polyneuropathy pain     HPI:         Today primarily complaining of back pain.  Overall however she says she is doing okay and she just has learned to live with it.    Shared with her that her brain imaging only showed chronic ischemic disease.  She had a clinical stroke back in 2015.  She is on full-dose aspirin.    Reassured her that her cervical spinal cord signal was okay and that she did not have any cervical cord stenosis.    Current Outpatient Medications   Medication Instructions    ARIPiprazole (ABILIFY) 5 mg    ascorbic acid (vitamin C) (VITAMIN C) 1,000 mg    aspirin 325 mg    cholecalciferol, vitamin D3, (VITAMIN D3) 50 mcg (2,000 unit) Cap Take by mouth.    dapagliflozin propanediol (FARXIGA) 10 mg, Daily    FETZIMA 80 mg Cs24 1 capsule    furosemide (LASIX) 40 MG tablet Take by mouth.    gabapentin (NEURONTIN) 600 mg, 2 times daily    hydrOXYzine (ATARAX) 50 MG tablet TAKE ONE TABLET BY MOUTH THREE TIMES DAILY AS NEEDED FOR ANXIETY AS NEEDED    metoprolol succinate (TOPROL-XL) 50 mg    naloxegoL (MOVANTIK) 25 mg, Daily PRN    nitroGLYCERIN (NITROSTAT) 0.4 mg, Daily PRN    oxyCODONE-acetaminophen (PERCOCET) 7.5-325 mg per tablet 1 tablet, 2 times daily PRN    rosuvastatin (CRESTOR) 40 MG Tab TAKE ONE TABLET BY MOUTH EVERY DAY AT NIGHT    tiZANidine (ZANAFLEX) 4 mg, 2 times daily PRN    topiramate (TOPAMAX) 100 mg    traZODone (DESYREL) 300 mg, Nightly    traZODone (DESYREL) 300 mg, Nightly    zinc gluconate 50 mg, Daily        Objective:      Exam Limited due to telemedicine restrictions.  There were no vitals taken for this visit.  if accompanied, by:_ alone  Speech:  Normal    Neuroimaging:Images and imaging reports had been reviewed  and brain MRI reviewed again today while we were on the call.  My prior comments:   11.19 Brain MRI chr isch ch L fr moreso than R parietal   agree no acute features     11.19 C sp post op ch; left c45 archana narrowing      Assessment/Plan:     Problem List Items Addressed This Visit          Neuro    Impaired balance as late effect of cerebrovascular accident    Polyneuropathy - Primary       Other comments/ follow up:         Apologized to her that I had nothing additionally helpful to offer.  No follow up requested at this time.      If she has different issues in the future requiring neurological attention she can reach out to us.  Please excuse electronic transcription errors    Video Time Documentation:  Spent 5 minutes with patient over video discussing health concerns.   Total time this visit:    8  minutes    This is a telemedicine note.   Patient was treated using telemedicine, real time audio and video, according to [facility] protocols. This visit is not recorded.  I, Duane Bansal MD, conducted the visit from the Neurology clinic of [facility]. The patient participated in the visit at a non-[facility] location selected by the patient, [facility].   I am licensed in [place] where the patient stated they are located. The patient stated that they understood and accepted the privacy and security risks to their information at their location.

## 2025-02-21 ENCOUNTER — RESULTS FOLLOW-UP (OUTPATIENT)
Dept: OTHER | Facility: CLINIC | Age: 60
End: 2025-02-21